# Patient Record
Sex: FEMALE | Race: WHITE
[De-identification: names, ages, dates, MRNs, and addresses within clinical notes are randomized per-mention and may not be internally consistent; named-entity substitution may affect disease eponyms.]

---

## 2018-05-24 ENCOUNTER — HOSPITAL ENCOUNTER (EMERGENCY)
Dept: HOSPITAL 53 - M ED | Age: 53
Discharge: HOME | End: 2018-05-24
Payer: COMMERCIAL

## 2018-05-24 DIAGNOSIS — Z88.5: ICD-10-CM

## 2018-05-24 DIAGNOSIS — H83.09: Primary | ICD-10-CM

## 2018-05-24 DIAGNOSIS — Z79.899: ICD-10-CM

## 2018-05-24 DIAGNOSIS — E78.5: ICD-10-CM

## 2018-05-24 DIAGNOSIS — I10: ICD-10-CM

## 2018-05-24 DIAGNOSIS — Z91.030: ICD-10-CM

## 2018-05-24 LAB
ANION GAP: 5 MEQ/L (ref 8–16)
BASO #: 0 10^3/UL (ref 0–0.2)
BASO %: 0.3 % (ref 0–1)
BLOOD UREA NITROGEN: 20 MG/DL (ref 7–18)
CALCIUM LEVEL: 8.5 MG/DL (ref 8.5–10.1)
CARBON DIOXIDE LEVEL: 28 MEQ/L (ref 21–32)
CHLORIDE LEVEL: 108 MEQ/L (ref 98–107)
CK MB CFR.DF SERPL CALC: 1.94
CK SERPL-CCNC: 72 U/L (ref 26–192)
CK-MB VALUE MASS: 1.4 NG/ML (ref ?–3.6)
CREATININE FOR GFR: 0.64 MG/DL (ref 0.55–1.3)
EOS #: 0.1 10^3/UL (ref 0–0.5)
EOSINOPHIL NFR BLD AUTO: 0.4 % (ref 0–3)
ETHYL ALCOHOL (ETHANOL): < 0.003 % (ref 0–0.01)
GFR SERPL CREATININE-BSD FRML MDRD: > 60 ML/MIN/{1.73_M2} (ref 51–?)
GLUCOSE, FASTING: 94 MG/DL (ref 70–100)
HEMATOCRIT: 38.5 % (ref 36–47)
HEMOGLOBIN: 12.5 G/DL (ref 12–15.5)
IMMATURE GRANULOCYTE %: 0.4 % (ref 0–3)
LYMPH #: 0.4 10^3/UL (ref 1.5–4.5)
LYMPH %: 3.9 % (ref 24–44)
MEAN CORPUSCULAR HEMOGLOBIN: 28.5 PG (ref 27–33)
MEAN CORPUSCULAR HGB CONC: 32.5 G/DL (ref 32–36.5)
MEAN CORPUSCULAR VOLUME: 87.9 FL (ref 80–96)
MONO #: 1 10^3/UL (ref 0–0.8)
MONO %: 8.8 % (ref 0–5)
NEUTROPHILS #: 9.6 10^3/UL (ref 1.8–7.7)
NEUTROPHILS %: 86.2 % (ref 36–66)
NRBC BLD AUTO-RTO: 0 % (ref 0–0)
PLATELET COUNT, AUTOMATED: 174 10^3/UL (ref 150–450)
POTASSIUM SERUM: 4.3 MEQ/L (ref 3.5–5.1)
RED BLOOD COUNT: 4.38 10^6/UL (ref 4–5.4)
RED CELL DISTRIBUTION WIDTH: 13.7 % (ref 11.5–14.5)
SODIUM LEVEL: 141 MEQ/L (ref 136–145)
TROPONIN I: < 0.02 NG/ML (ref ?–0.1)
WHITE BLOOD COUNT: 11.1 10^3/UL (ref 4–10)

## 2018-05-24 PROCEDURE — 70450 CT HEAD/BRAIN W/O DYE: CPT

## 2018-05-24 RX ADMIN — MECLIZINE HYDROCHLORIDE 1 MG: 25 TABLET ORAL at 09:45

## 2019-03-18 ENCOUNTER — HOSPITAL ENCOUNTER (OUTPATIENT)
Dept: HOSPITAL 53 - M WHC | Age: 54
End: 2019-03-18
Attending: NURSE PRACTITIONER
Payer: COMMERCIAL

## 2019-03-18 DIAGNOSIS — Z92.0: ICD-10-CM

## 2019-03-18 DIAGNOSIS — Z12.31: Primary | ICD-10-CM

## 2019-03-18 NOTE — REPMRS
Patient History

The patient states she had a clinical breast exam in 03/2019.

Family history of breast cancer at age 50 or over in paternal 

grandmother, prostate cancer at age 78 in father.

Took hormonal contraceptives for 10 years.

 

3D TOMOSYNTHESIS WAS PERFORMED.

 

Digital Woman Screen Mammo: March 18, 2019 - Exam #: 

[id number]

Bilateral CC and MLO view(s) were taken.

 

Technologist: Lorena Zimmerman, Technologist

Prior study comparison: March 16, 2018, digital woman screen 

mammo performed at [facility].  January 25, 2017, 

digital woman screen mammo performed at [facility].

 

FINDINGS: The breast tissue is heterogeneously dense.  This may 

lower the sensitivity of mammography.  There has been no change 

in the appearance of the mammogram from the prior studies.  There

is a moderate amount of residual fibroglandular tissue which is 

fairly symmetric. There is no interval development of dominant 

mass, areas of architectural distortion, or clustered 

microcalcification typical of malignancy.

 

Assessment: BI-RADS/ACR category 1 mammogram. Negative Mammogram.

 

Recommendation

Routine screening mammogram in 1 year (for women over age 40).

This mammogram was interpreted with the aid of an FDA-approved 

computer-aided dectection system.

 

Electronically Signed By: Darrell Tsang MD 03/18/19 7161

## 2019-08-07 ENCOUNTER — HOSPITAL ENCOUNTER (EMERGENCY)
Dept: HOSPITAL 53 - M ED | Age: 54
Discharge: HOME | End: 2019-08-07
Payer: COMMERCIAL

## 2019-08-07 VITALS — WEIGHT: 160.34 LBS | HEIGHT: 62 IN | BODY MASS INDEX: 29.51 KG/M2

## 2019-08-07 VITALS — SYSTOLIC BLOOD PRESSURE: 141 MMHG | DIASTOLIC BLOOD PRESSURE: 64 MMHG

## 2019-08-07 DIAGNOSIS — Z91.030: ICD-10-CM

## 2019-08-07 DIAGNOSIS — Z88.5: ICD-10-CM

## 2019-08-07 DIAGNOSIS — E03.9: ICD-10-CM

## 2019-08-07 DIAGNOSIS — I10: ICD-10-CM

## 2019-08-07 DIAGNOSIS — Z79.899: ICD-10-CM

## 2019-08-07 DIAGNOSIS — E78.5: ICD-10-CM

## 2019-08-07 DIAGNOSIS — M62.830: Primary | ICD-10-CM

## 2019-08-07 LAB
ALBUMIN SERPL BCG-MCNC: 4.2 GM/DL (ref 3.2–5.2)
ALT SERPL W P-5'-P-CCNC: 22 U/L (ref 12–78)
APPEARANCE UR: CLEAR
BACTERIA UR QL AUTO: NEGATIVE
BASOPHILS # BLD AUTO: 0 10^3/UL (ref 0–0.2)
BASOPHILS NFR BLD AUTO: 0.3 % (ref 0–1)
BILIRUB CONJ SERPL-MCNC: 0.2 MG/DL (ref 0–0.2)
BILIRUB SERPL-MCNC: 0.5 MG/DL (ref 0.2–1)
BILIRUB UR QL STRIP.AUTO: NEGATIVE
BUN SERPL-MCNC: 15 MG/DL (ref 7–18)
CALCIUM SERPL-MCNC: 9.2 MG/DL (ref 8.5–10.1)
CHLORIDE SERPL-SCNC: 110 MEQ/L (ref 98–107)
CO2 SERPL-SCNC: 29 MEQ/L (ref 21–32)
CREAT SERPL-MCNC: 0.65 MG/DL (ref 0.55–1.3)
EOSINOPHIL # BLD AUTO: 0.2 10^3/UL (ref 0–0.5)
EOSINOPHIL NFR BLD AUTO: 2.8 % (ref 0–3)
GFR SERPL CREATININE-BSD FRML MDRD: > 60 ML/MIN/{1.73_M2} (ref 51–?)
GLUCOSE SERPL-MCNC: 94 MG/DL (ref 70–100)
GLUCOSE UR QL STRIP.AUTO: NEGATIVE MG/DL
HCG UR QL: NEGATIVE
HCT VFR BLD AUTO: 41.5 % (ref 36–47)
HGB BLD-MCNC: 14 G/DL (ref 12–15.5)
HGB UR QL STRIP.AUTO: NEGATIVE
KETONES UR QL STRIP.AUTO: NEGATIVE MG/DL
LEUKOCYTE ESTERASE UR QL STRIP.AUTO: (no result)
LIPASE SERPL-CCNC: 106 U/L (ref 73–393)
LIPASE SERPL-CCNC: 121 U/L (ref 73–393)
LYMPHOCYTES # BLD AUTO: 1.5 10^3/UL (ref 1.5–4.5)
LYMPHOCYTES NFR BLD AUTO: 24.3 % (ref 24–44)
MCH RBC QN AUTO: 30.6 PG (ref 27–33)
MCHC RBC AUTO-ENTMCNC: 33.7 G/DL (ref 32–36.5)
MCV RBC AUTO: 90.6 FL (ref 80–96)
MONOCYTES # BLD AUTO: 0.9 10^3/UL (ref 0–0.8)
MONOCYTES NFR BLD AUTO: 15.2 % (ref 0–5)
NEUTROPHILS # BLD AUTO: 3.5 10^3/UL (ref 1.8–7.7)
NEUTROPHILS NFR BLD AUTO: 57.2 % (ref 36–66)
NITRITE UR QL STRIP.AUTO: NEGATIVE
PH UR STRIP.AUTO: 6 UNITS (ref 5–9)
PLATELET # BLD AUTO: 211 10^3/UL (ref 150–450)
POTASSIUM SERPL-SCNC: 5 MEQ/L (ref 3.5–5.1)
PROT SERPL-MCNC: 7.3 GM/DL (ref 6.4–8.2)
PROT UR QL STRIP.AUTO: NEGATIVE MG/DL
RBC # BLD AUTO: 4.58 10^6/UL (ref 4–5.4)
RBC # UR AUTO: 1 /HPF (ref 0–3)
SODIUM SERPL-SCNC: 144 MEQ/L (ref 136–145)
SP GR UR STRIP.AUTO: 1 (ref 1–1.03)
SQUAMOUS #/AREA URNS AUTO: 0 /HPF (ref 0–6)
UROBILINOGEN UR QL STRIP.AUTO: 0.2 MG/DL (ref 0–2)
WBC # BLD AUTO: 6.1 10^3/UL (ref 4–10)
WBC #/AREA URNS AUTO: 2 /HPF (ref 0–3)

## 2019-08-07 PROCEDURE — 81001 URINALYSIS AUTO W/SCOPE: CPT

## 2019-08-07 PROCEDURE — 96361 HYDRATE IV INFUSION ADD-ON: CPT

## 2019-08-07 PROCEDURE — 96374 THER/PROPH/DIAG INJ IV PUSH: CPT

## 2019-08-07 PROCEDURE — 96375 TX/PRO/DX INJ NEW DRUG ADDON: CPT

## 2019-08-07 PROCEDURE — 85025 COMPLETE CBC W/AUTO DIFF WBC: CPT

## 2019-08-07 PROCEDURE — 80076 HEPATIC FUNCTION PANEL: CPT

## 2019-08-07 PROCEDURE — 80048 BASIC METABOLIC PNL TOTAL CA: CPT

## 2019-08-07 PROCEDURE — 74176 CT ABD & PELVIS W/O CONTRAST: CPT

## 2019-08-07 PROCEDURE — 84703 CHORIONIC GONADOTROPIN ASSAY: CPT

## 2019-08-07 PROCEDURE — 99284 EMERGENCY DEPT VISIT MOD MDM: CPT

## 2019-08-07 PROCEDURE — 83690 ASSAY OF LIPASE: CPT

## 2019-08-07 NOTE — REPVR
EXAM: 

CT Abdomen and Pelvis Without Contrast 



EXAM DATE/TIME: 

8/7/2019 4:45 AM 



CLINICAL HISTORY: 

54 years old, female; Abdominal pain; Flank; Left; Additional info: L colic 



TECHNIQUE: 

Imaging protocol: Axial computed tomography images of the abdomen and pelvis 

without contrast. Coronal and sagittal reformatted images were created and 

reviewed. 

Radiation optimization: All CT scans at this facility use at least one of these 

dose optimization techniques: automated exposure control; mA and/or kV 

adjustment per patient size (includes targeted exams where dose is matched to 

clinical indication); or iterative reconstruction. 



COMPARISON: 

[facility] NON-OB COMPLETE US 1/29/2015 10:10 AM 



FINDINGS: 



Liver: Normal. No mass. 

Gallbladder and bile ducts: Normal. No calcified stones. No ductal dilation. 

Pancreas: Normal. No ductal dilation. 

Spleen: Normal. No splenomegaly. 

Adrenals: Right adrenal fatty nodule measuring 2.1 x 1.8 x 2.2 cm. 

Kidneys and ureters: Normal. No hydronephrosis. 

Stomach and bowel: Normal. No obstruction. No mucosal thickening. 

Appendix: A normal appendix is seen. 

Intraperitoneal space: Normal. No free air. No significant fluid collection. 

Vasculature: There is mild calcification of the abdominal aorta with extension 

into the iliac arteries. 

Lymph nodes: Normal. No enlarged lymph nodes. 



Bladder: Unremarkable as visualized. 

Reproductive: Status post hysterectomy. 

Bones/joints: No acute fracture. No dislocation. 

Soft tissues: Small fat filled umbilical hernia. 



IMPRESSION: 

1. Right adrenal fatty nodule measuring 2.1 x 1.8 x 2.2 cm. 

2. Status post hysterectomy. 

3. Otherwise negative CT abdomen/pelvis. No renal or ureteral calculi are 

evident and there is no evidence of obstructive uropathy. 



Electronically signed by: Rolando Neville On 08/07/2019  06:22:50 AM

## 2020-03-19 ENCOUNTER — HOSPITAL ENCOUNTER (OUTPATIENT)
Dept: HOSPITAL 53 - M WHC | Age: 55
End: 2020-03-19
Attending: NURSE PRACTITIONER
Payer: COMMERCIAL

## 2020-03-19 DIAGNOSIS — Z12.31: Primary | ICD-10-CM

## 2020-03-19 NOTE — REPMRS
Patient History

The patient states she had a clinical breast exam in March 2020.Family history of breast cancer at age 50 or over in 

paternal grandmother, prostate cancer at age 78 in father.

Took hormonal contraceptives for 10 years.

 

3D TOMOSYNTHESIS WAS PERFORMED.

 

The Jae Deng lifetime risk for breast cancer is 16.7%.

 

Digital Woman Screen Mammo: March 19, 2020 - Exam #: 

[id number]

Bilateral CC and MLO view(s) were taken.

 

Technologist: Mariel Diggs, Technologist

Prior study comparison: March 18, 2019, bilateral digital woman 

screen mammo performed at [facility].  March 16, 2018, digital woman screen mammo performed at 

[facility].

 

FINDINGS: The breast tissue is heterogeneously dense.  This may 

lower the sensitivity of mammography.  There has been no change 

in the appearance of the mammogram from the prior studies.  There

is a moderate amount of residual fibroglandular tissue which is 

fairly symmetric. There is no interval development of dominant 

mass, areas of architectural distortion, or clustered 

microcalcification typical of malignancy.

 

Assessment: BI-RADS/ACR category 1 mammogram. Negative Mammogram.

 

Recommendation

Routine screening mammogram in 1 year (for women over age 40).

This mammogram was interpreted with the aid of an FDA-approved 

computer-aided dectection system.

 

Electronically Signed By: Darrell Tsang MD 03/19/20 1014

## 2021-02-25 ENCOUNTER — HOSPITAL ENCOUNTER (OUTPATIENT)
Dept: HOSPITAL 53 - M LABSMTC | Age: 56
End: 2021-02-25
Attending: PEDIATRICS
Payer: SELF-PAY

## 2021-02-25 DIAGNOSIS — Z11.52: Primary | ICD-10-CM

## 2021-04-21 ENCOUNTER — HOSPITAL ENCOUNTER (OUTPATIENT)
Dept: HOSPITAL 53 - M WHC | Age: 56
End: 2021-04-21
Attending: NURSE PRACTITIONER
Payer: COMMERCIAL

## 2021-04-21 DIAGNOSIS — Z12.31: Primary | ICD-10-CM

## 2021-04-21 NOTE — REPMRS
Patient History

The patient states she had a clinical breast exam in April 2021.

Family history of breast cancer at age 50 or over in paternal 

grandmother, prostate cancer at age 78 in father.

Took hormonal contraceptives for 10 years.

No Breast complaints. Pt signed MRS History Sheet.

 

Digital Woman Screen Mammo: April 21, 2021 - Exam #: 

[id number]

Bilateral CC and MLO view(s) were taken.

 

Technologist: Nicci Richardson, Technologist

Prior study comparison: March 19, 2020, bilateral digital woman 

screen mammo performed at [facility].  March 18, 2019, bilateral digital woman screen 

mammo performed at [facility].

 

FINDINGS: There are scattered fibroglandular densities.  

Screening.

 

Digital screening (2D) mammography was performed bilaterally in 

the CC and MLO projections. Additionally, breast tomosynthesis 

(3D mammography) was performed bilaterally in the CC and MLO 

projections. Todays exam was compared to the prior exams(s).

 

By history, the patient has no complaints of a palpable breast 

abnormality or other significant breast complaints.

 

The breasts are unchanged in size and shape.Once again, dense 

heterogeneous fibroglandular elements are seen bilaterally in a 

stable appearing pattern but to such a degree that the 

sensitivity of the mammogram in detecting cancer is decreased. 

There are no magalys-soft tissue densities or spiculated masses. 

There is no internal architectural distortion. There are no 

suspicious magalys-calcific clusters. Skin thickening or nipple 

retraction is not present.

 

IMPRESSION:

 

BI-RADS Category 2- Benign Findings(s). There is no evidence of 

malignant alteration of the breasts. Followup examination 

recommended in one year.

 

This mammogram was read with the assistance of i[employer] PowerLook 

AMP,an FDA approved computer aided detection system for 

mammography.

 

Negative x-ray reports should not delay surgical consultation if 

a dominant or clinically suspicious mass is present.

 

Not all breast cancers can be identified by mammography. 

Therefore, we recommend that you continue to perform regular 

breast self-examination and physical examination and then 

promptly contact your physician of any concerns or changes.

 

Adenosis and dense breasts may obscure an underlying neoplasm.

 

Assessment: BI-RADS/ACR category 2 mammogram. Benign Findings.

 

Recommendation

Routine screening mammogram of both breasts in 1 year.

 

Electronically Signed By: Ankur Benito DO 04/21/21 0953

## 2022-06-13 ENCOUNTER — HOSPITAL ENCOUNTER (OUTPATIENT)
Dept: HOSPITAL 53 - M WHC | Age: 57
End: 2022-06-13
Attending: OBSTETRICS & GYNECOLOGY
Payer: COMMERCIAL

## 2022-06-13 DIAGNOSIS — Z12.31: Primary | ICD-10-CM

## 2022-06-13 DIAGNOSIS — Z80.3: ICD-10-CM

## 2022-09-07 ENCOUNTER — HOSPITAL ENCOUNTER (OUTPATIENT)
Dept: HOSPITAL 53 - M LABSMTC | Age: 57
End: 2022-09-07
Attending: ANESTHESIOLOGY
Payer: COMMERCIAL

## 2022-09-07 DIAGNOSIS — Z11.52: ICD-10-CM

## 2022-09-07 DIAGNOSIS — Z01.818: Primary | ICD-10-CM

## 2022-09-12 ENCOUNTER — HOSPITAL ENCOUNTER (OUTPATIENT)
Dept: HOSPITAL 53 - M OPP | Age: 57
Discharge: HOME | End: 2022-09-12
Attending: INTERNAL MEDICINE
Payer: COMMERCIAL

## 2022-09-12 VITALS — DIASTOLIC BLOOD PRESSURE: 61 MMHG | SYSTOLIC BLOOD PRESSURE: 111 MMHG

## 2022-09-12 VITALS — WEIGHT: 161 LBS | HEIGHT: 63 IN | BODY MASS INDEX: 28.53 KG/M2

## 2022-09-12 DIAGNOSIS — Z88.5: ICD-10-CM

## 2022-09-12 DIAGNOSIS — Z79.899: ICD-10-CM

## 2022-09-12 DIAGNOSIS — I10: ICD-10-CM

## 2022-09-12 DIAGNOSIS — E78.00: ICD-10-CM

## 2022-09-12 DIAGNOSIS — Z79.02: ICD-10-CM

## 2022-09-12 DIAGNOSIS — Z12.11: Primary | ICD-10-CM

## 2022-09-12 DIAGNOSIS — Z91.030: ICD-10-CM

## 2022-09-12 DIAGNOSIS — E03.9: ICD-10-CM

## 2022-09-12 DIAGNOSIS — F41.9: ICD-10-CM

## 2022-09-12 DIAGNOSIS — Z79.1: ICD-10-CM

## 2023-12-01 ENCOUNTER — HOSPITAL ENCOUNTER (OUTPATIENT)
Dept: HOSPITAL 53 - M WHC | Age: 58
End: 2023-12-01
Attending: OBSTETRICS & GYNECOLOGY
Payer: COMMERCIAL

## 2023-12-01 DIAGNOSIS — Z12.31: Primary | ICD-10-CM

## 2024-02-06 ENCOUNTER — HOSPITAL ENCOUNTER (OUTPATIENT)
Dept: HOSPITAL 53 - M SFHCWAGY | Age: 59
End: 2024-02-06
Attending: OBSTETRICS & GYNECOLOGY
Payer: COMMERCIAL

## 2024-02-06 DIAGNOSIS — R10.2: Primary | ICD-10-CM

## 2024-04-18 ENCOUNTER — HOSPITAL ENCOUNTER (OUTPATIENT)
Dept: HOSPITAL 53 - M LAB REF | Age: 59
End: 2024-04-18
Attending: INTERNAL MEDICINE
Payer: COMMERCIAL

## 2024-04-18 DIAGNOSIS — D50.9: Primary | ICD-10-CM

## 2024-04-19 LAB
FERRITIN SERPL-MCNC: 3.5 NG/ML (ref 7.3–270.7)
IRON SATN MFR SERPL: 7.7 % (ref 13.2–45)
IRON SERPL-MCNC: 34 UG/DL (ref 50–170)
TIBC SERPL-MCNC: 440 UG/DL (ref 250–425)
VIT B12 SERPL-MCNC: 354 PG/ML (ref 211–911)

## 2024-10-06 ENCOUNTER — HOSPITAL ENCOUNTER (OUTPATIENT)
Dept: HOSPITAL 53 - M LAB REF | Age: 59
End: 2024-10-06
Attending: STUDENT IN AN ORGANIZED HEALTH CARE EDUCATION/TRAINING PROGRAM
Payer: COMMERCIAL

## 2024-10-06 DIAGNOSIS — R19.7: Primary | ICD-10-CM

## 2024-12-05 ENCOUNTER — HOSPITAL ENCOUNTER (OUTPATIENT)
Dept: HOSPITAL 53 - M LAB REF | Age: 59
End: 2024-12-05
Attending: INTERNAL MEDICINE
Payer: COMMERCIAL

## 2024-12-05 DIAGNOSIS — D64.9: Primary | ICD-10-CM

## 2024-12-06 LAB
FERRITIN SERPL-MCNC: 6.2 NG/ML (ref 7.3–270.7)
IRON SATN MFR SERPL: 10.9 % (ref 13.2–45)
IRON SERPL-MCNC: 47 UG/DL (ref 50–170)
TIBC SERPL-MCNC: 432 UG/DL (ref 250–425)
VIT B12 SERPL-MCNC: 452 PG/ML (ref 211–911)

## 2025-03-21 ENCOUNTER — HOSPITAL ENCOUNTER (OUTPATIENT)
Dept: HOSPITAL 53 - M OPP | Age: 60
Discharge: HOME | End: 2025-03-21
Attending: SURGERY
Payer: COMMERCIAL

## 2025-03-21 VITALS — TEMPERATURE: 97.5 F

## 2025-03-21 VITALS — SYSTOLIC BLOOD PRESSURE: 161 MMHG | OXYGEN SATURATION: 97 % | DIASTOLIC BLOOD PRESSURE: 83 MMHG

## 2025-03-21 VITALS — WEIGHT: 169.2 LBS | HEIGHT: 63 IN | BODY MASS INDEX: 29.98 KG/M2

## 2025-03-21 DIAGNOSIS — Z88.5: ICD-10-CM

## 2025-03-21 DIAGNOSIS — Z91.030: ICD-10-CM

## 2025-03-21 DIAGNOSIS — Z79.899: ICD-10-CM

## 2025-03-21 DIAGNOSIS — K22.89: ICD-10-CM

## 2025-03-21 DIAGNOSIS — K44.9: Primary | ICD-10-CM

## 2025-03-21 PROCEDURE — 88305 TISSUE EXAM BY PATHOLOGIST: CPT

## 2025-03-21 PROCEDURE — 43239 EGD BIOPSY SINGLE/MULTIPLE: CPT

## 2025-05-01 ENCOUNTER — HOSPITAL ENCOUNTER (OUTPATIENT)
Dept: HOSPITAL 53 - M WHC | Age: 60
End: 2025-05-01
Attending: OBSTETRICS & GYNECOLOGY
Payer: COMMERCIAL

## 2025-05-01 ENCOUNTER — HOSPITAL ENCOUNTER (OUTPATIENT)
Dept: HOSPITAL 53 - M SFHCWAGY | Age: 60
End: 2025-05-01
Attending: OBSTETRICS & GYNECOLOGY
Payer: COMMERCIAL

## 2025-05-01 DIAGNOSIS — Z12.31: Primary | ICD-10-CM

## 2025-05-01 DIAGNOSIS — R87.615: Primary | ICD-10-CM

## 2025-05-01 DIAGNOSIS — R92.323: ICD-10-CM

## 2025-05-01 PROCEDURE — 87624 HPV HI-RISK TYP POOLED RSLT: CPT
